# Patient Record
Sex: FEMALE | Employment: OTHER | URBAN - METROPOLITAN AREA
[De-identification: names, ages, dates, MRNs, and addresses within clinical notes are randomized per-mention and may not be internally consistent; named-entity substitution may affect disease eponyms.]

---

## 2017-07-14 ENCOUNTER — TELEPHONE (OUTPATIENT)
Dept: DERMATOLOGY | Facility: AMBULATORY SURGERY CENTER | Age: 82
End: 2017-07-14

## 2017-07-14 NOTE — TELEPHONE ENCOUNTER
Patient Appointment Date: 07/17/2017      Rodo Montemayor, 80 y.o., female  Is calling for their Mohs Pre-Op Assessment    does not have Hepatitis C or HIV (If YES, set up consult appointment)  does confirm site     Allergies: Allergies   Allergen Reactions    Morphine Nausea Only    Sulfa (Sulfonamide Antibiotics) Nausea Only         does not have a Pacemaker  does not have a Defibrillator    does not need antibiotics      is not taking NSAIDs    is taking aspirin      is not taking garlic  is not taking ginkgo  is not taking Ginseng  is not taking fish oils  is not taking vit E    does not take a blood thinner    is not taking Coumadin/Warfarin       Pre operative assessment questions asked to patient. Patient has a general understanding of the procedure, and has been versed that there will be local anesthesia used in the procedure and that She will be ok to drive themselves to and from the appointment.

## 2017-07-17 ENCOUNTER — OFFICE VISIT (OUTPATIENT)
Dept: DERMATOLOGY | Facility: AMBULATORY SURGERY CENTER | Age: 82
End: 2017-07-17

## 2017-07-17 VITALS
DIASTOLIC BLOOD PRESSURE: 72 MMHG | HEART RATE: 68 BPM | OXYGEN SATURATION: 97 % | BODY MASS INDEX: 29.06 KG/M2 | WEIGHT: 148 LBS | HEIGHT: 60 IN | SYSTOLIC BLOOD PRESSURE: 144 MMHG | TEMPERATURE: 97.6 F

## 2017-07-17 DIAGNOSIS — C44.329 SQUAMOUS CELL CARCINOMA OF SKIN OF LEFT CHEEK: Primary | ICD-10-CM

## 2017-07-17 RX ORDER — BUPIVACAINE HYDROCHLORIDE AND EPINEPHRINE 2.5; 5 MG/ML; UG/ML
INJECTION, SOLUTION INFILTRATION; PERINEURAL
Qty: 1.5 ML | Refills: 0
Start: 2017-07-17

## 2017-07-17 RX ORDER — LIDOCAINE HYDROCHLORIDE AND EPINEPHRINE 10; 10 MG/ML; UG/ML
3 INJECTION, SOLUTION INFILTRATION; PERINEURAL ONCE
Qty: 3 ML | Refills: 0
Start: 2017-07-17 | End: 2017-07-17

## 2017-07-17 NOTE — MR AVS SNAPSHOT
Visit Information Date & Time Provider Department Dept. Phone Encounter #  
 7/17/2017  1:00 PM MD Bandar Moody 8057 [phone] [id number] Upcoming Health Maintenance Date Due DTaP/Tdap/Td series (1 - Tdap) 6/25/1950 ZOSTER VACCINE AGE 60> 6/25/1989 GLAUCOMA SCREENING Q2Y 6/25/1994 Pneumococcal 65+ Low/Medium Risk (1 of 2 - PCV13) 6/25/1994 MEDICARE YEARLY EXAM 6/25/1994 INFLUENZA AGE 9 TO ADULT 8/1/2017 Allergies as of 7/17/2017  Review Complete On: 7/17/2017 By: Julianna [facility] Severity Noted Reaction Type Reactions Morphine  11/03/2014    Nausea Only Sulfa (Sulfonamide Antibiotics)  11/03/2014    Nausea Only Current Immunizations  Never Reviewed Name Date Influenza Vaccine 11/5/2014  1:31 PM  
  
 Not reviewed this visit You Were Diagnosed With   
  
 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  
 144/72 (BP 1 Location: Left arm, BP Patient Position: Sitting) 68 97.6 °F (36.4 °C) (Oral) 5' (1.524 m) 148 lb (67.1 kg) 97% BMI OB Status Smoking Status 28.9 kg/m2 Postmenopausal Former Smoker BMI and BSA Data Body Mass Index Body Surface Area  
 28.9 kg/m 2 1.69 m 2 Your Updated Medication List  
  
   
This list is accurate as of: 7/17/17  1:37 PM.  Always use your most recent med list.  
  
  
  
  
 aspirin 81 mg chewable tablet Take 81 mg by mouth daily. BYSTOLIC 5 mg tablet Generic drug:  nebivolol Take 5 mg by mouth daily. DIOVAN 320 mg tablet Generic drug:  valsartan Take 320 mg by mouth daily. glipiZIDE SR 2.5 mg CR tablet Commonly known as:  GLUCOTROL XL Take 2.5 mg by mouth daily. HYDROcodone-acetaminophen 5-325 mg per tablet Commonly known as:  Lexus Marroquin  
 Take 1 tablet by mouth every six (6) hours as needed for Pain.  
  
 metFORMIN 500 mg tablet Commonly known as:  GLUCOPHAGE Take 500 mg by mouth two (2) times daily (with meals). nitrofurantoin 50 mg capsule Commonly known as:  MACRODANTIN Take 50 mg by mouth daily. uti prophylaxis  
  
 pravastatin 80 mg tablet Commonly known as:  PRAVACHOL Take 80 mg by mouth nightly. PRESERVISION LUTEIN PO Take 1 tablet by mouth daily. TRIAMTERENE-HYDROCHLOROTHIAZIDE 37.5 MG-25 MG TAB Take 1 tablet by mouth daily. Patient Instructions WOUND CARE INSTRUCTIONS 1. Keep the dressing clean and dry and do not remove for 48 hours. 2. Then change the dressing once a day as follows: 
a. Wash hands before and after each dressing change. b. Remove dressing and wash site gently with mild soap and water, rinse, and pat dry. 
c. Apply an ointment (Bacitracin, Polysporin, Neosporin, Petroleum jelly or Aquaphor). d. Apply a non-stick (Telfa) dressing or Band-Aid to cover the wound. Remove pressure bandage Wednesday, then wash gently and apply Vaseline and a band-aid to site daily for 1 week. 3. Watch for: BLEEDING: A small amount of drainage may occur. If bleeding occurs, elevate and rest the surgery site. Apply gauze and steady pressure for 15 minutes. If bleeding continues, call this office. INFECTION: Signs of infection include increased redness, pain, warmth, drainage of pus, and fever. If this occurs, call this office. 4. Special Instructions (follow any that are checked): ·  You have stitches that need to be removed in 0 days ·  Avoid bending at the waist and heavy lifting for two days. ·  Sleep with your head elevated for the next two nights. ·  Rest the surgery site and keep it elevated as much as possible for two days. ·  You may apply an ice-pack for 10-15 minutes every waking hour for the rest of the day. ·  Eat a soft diet and avoid hot food and hot drinks for the rest of the day. ·  Other instructions: Follow up as directed Take Tylenol for pain as needed. Once the site is healed with no remaining bandages or open areas, protect your surgical site and scar from the sun, as this area will be more sensitive. Use a broad spectrum sunscreen SPF 30 or higher daily, and a chemical free product (one containing zinc oxide or titanium dioxide) is a good choice if the area is sensitive. You may begin to gently massage the surgical site in 2-3 weeks, rubbing in a circular motion along the scar. This can help reduce swelling and thickness of a scar. A scar cream may be used beginnning 1 month after the surgery. If you have any questions or concerns, please call our office Monday through Friday at [phone]. Introducing hospitals & HEALTH SERVICES! [facility] introduces [employer] patient portal. Now you can access parts of your medical record, email your doctor's office, and request medication refills online. 1. In your internet browser, go to [email or web address] 2. Click on the First Time User? Click Here link in the Sign In box. You will see the New Member Sign Up page. 3. Enter your [employer] Access Code exactly as it appears below. You will not need to use this code after youve completed the sign-up process. If you do not sign up before the expiration date, you must request a new code. · [employer] Access Code: [email or web address] Expires: 10/15/2017  1:37 PM 
 
4. Enter the last four digits of your Social Security Number (xxxx) and Date of Birth (mm/dd/yyyy) as indicated and click Submit. You will be taken to the next sign-up page. 5. Create a [employer] ID. This will be your [employer] login ID and cannot be changed, so think of one that is secure and easy to remember. 6. Create a [employer] password. You can change your password at any time. 7. Enter your Password Reset Question and Answer. This can be used at a later time if you forget your password. 8. Enter your e-mail address. You will receive e-mail notification when new information is available in [address] 9. Click Sign Up. You can now view and download portions of your medical record. 10. Click the Download Summary menu link to download a portable copy of your medical information. If you have questions, please visit the Frequently Asked Questions section of the [employer] website. Remember, [employer] is NOT to be used for urgent needs. For medical emergencies, dial 911. Now available from your iPhone and Android! Please provide this summary of care documentation to your next provider. Your primary care clinician is listed as Angélica Hull. If you have any questions after today's visit, please call [phone].

## 2017-07-17 NOTE — PATIENT INSTRUCTIONS
WOUND CARE INSTRUCTIONS    1. Keep the dressing clean and dry and do not remove for 48 hours. 2. Then change the dressing once a day as follows:  a. Wash hands before and after each dressing change. b. Remove dressing and wash site gently with mild soap and water, rinse, and pat dry.  c. Apply an ointment (Bacitracin, Polysporin, Neosporin, Petroleum jelly or Aquaphor). d. Apply a non-stick (Telfa) dressing or Band-Aid to cover the wound. Remove pressure bandage Wednesday, then wash gently and apply Vaseline and a band-aid to site daily for 1 week. 3. Watch for:  BLEEDING: A small amount of drainage may occur. If bleeding occurs, elevate and rest the surgery site. Apply gauze and steady pressure for 15 minutes. If bleeding continues, call this office. INFECTION: Signs of infection include increased redness, pain, warmth, drainage of pus, and fever. If this occurs, call this office. 4. Special Instructions (follow any that are checked):  · [] You have stitches that need to be removed in 0 days  · [x] Avoid bending at the waist and heavy lifting for two days. · [x] Sleep with your head elevated for the next two nights. · [x] Rest the surgery site and keep it elevated as much as possible for two days. · [x] You may apply an ice-pack for 10-15 minutes every waking hour for the rest of the day. · [] Eat a soft diet and avoid hot food and hot drinks for the rest of the day. · [] Other instructions: Follow up as directed  Take Tylenol for pain as needed. Once the site is healed with no remaining bandages or open areas, protect your surgical site and scar from the sun, as this area will be more sensitive. Use a broad spectrum sunscreen SPF 30 or higher daily, and a chemical free product (one containing zinc oxide or titanium dioxide) is a good choice if the area is sensitive. You may begin to gently massage the surgical site in 2-3 weeks, rubbing in a circular motion along the scar.  This can help reduce swelling and thickness of a scar. A scar cream may be used beginnning 1 month after the surgery. If you have any questions or concerns, please call our office Monday through Friday at [phone].

## 2017-07-17 NOTE — PROGRESS NOTES
Pre-op: Patient present today for the evaluation of SCC to the left mid cheek. Procedure explained with full understanding. Vitals:    07/17/17 1312   BP: 144/72   Pulse: 68   Temp: 97.6 °F (36.4 °C)   TempSrc: Oral   SpO2: 97%   Weight: 67.1 kg (148 lb)   Height: 5' (1.524 m)     preoperatively, will continue to monitor. Post-op: Written and verbal post-op wound care instructions given to patient with full understanding of care. Surgical wound bandaged with Vaseline, Telfa, 2x2 gauze, and coverall tape. All questions and concerns addressed. Vitals stable postoperatively.

## 2017-07-17 NOTE — PROGRESS NOTES
This note is written by Emiliano Kumari, as dictated by Camila Alford. Marie Rader MD.    CC: Squamous cell carcinoma on the left mid cheek     History of present illness:     Carmelita Grey is a 80 y.o. female referred by Dr. Eduardo Ley. 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 This is a new squamous cell carcinoma present for less than six months with no prior treatment. Biopsy confirmed the diagnosis of squamous cell carcinoma, and I reviewed the written pathology. She is feeling well and in her usual state of health today. She has no pain, no current illnesses, no other skin concerns. Her allergies, medications, medical, and social history are reviewed by me today. Her daughter is with her today. Exam:     She is an awake, alert, and oriented 80 y.o. female who appears well and in no distress. There is no preauricular, submandibular, or cervical lymphadenopathy. I examined her face. She has a  6 x 3 mm white scar adjacent to a lentigo, which has no concerning features, on her left mid cheek. She confirms location. Assessment/plan:    1. Squamous cell carcinoma, left mid cheek. I discussed the diagnosis of squamous cell carcinoma and summarized the pathology report. Mohs surgery is indicated by site and histology. The procedure was discussed, verbal and written consent were obtained. I performed the procedure. One stage was required to reach a tumor free plane. The surgical defect was managed with intermediate repair. There were no complications. She will follow up as needed as the site heals. Indications, risks, and options were discussed with Carmelita Grey preoperatively. Risks including, but not limited to: pain, bleeding, infection, tumor recurrence, scarring and damage to motor and/or sensory nerves, were discussed. Carmelita Grey chose Mohs surgery. Carmelita Grey was an acceptable surgery candidate.     Carmelita Grey was placed in the appropriate position on the operating table in the Mohs surgery procedure room. The area was prepped and draped in the standard manner. Gentian violet was used to outline the clinical margins of the tumor. Local anesthesia was then obtained. The grossly visible tumor was then removed, an underlying layer was excised and mapped according to the Mohs technique, and the individual specimens examined microscopically. The process was repeated until microscopic examination of the tissue specimens confirmed a tumor-free plane. Hemostasis was obtained with electrosurgery and pressure. The wound was covered between stages with moist saline gauze. The wound management options of second intent healing, layered closure, local flap, and/or full thickness skin graft were discussed. Rosario Byers understands the aims, risks, alternatives, and possible complications and elects to proceed with an intermediate layered closure. Wound margins were made vertical, edges undermined in the subcutaneous plane, standing cones corrected at both poles followed by layered closure. The wound was closed with buried 6-0 polysorb suture in the subcutis to reduce tension on the skin edges, and skin edges were approximated with 6-0 polysorb suture in the dermis to reduce tension on the epidermis. The final closure length was 18 mm. The wound was bandaged with Vaseline, Telfa, gauze and Coverroll. Wound care instructions (written and verbal) and a follow up appointment were given to Rosario Byers before discharge. Rosario Byers was discharged in good condition. 2. History of squamous cell carcinoma. I discussed the diagnosis and recommend routine examinations with Dr. Brown Puentes for surveillance. The documentation recorded by the scribe accurately reflects the service I personally performed and the decisions made by me.      [facility]   OFFICE PROCEDURE PROGRESS NOTE     Chart reviewed for the following:     I, Britt Duarte. Soledad Razo MD, have reviewed the History, Physical and updated the Allergic reactions for Crissy Delgado    TIME OUT performed immediately prior to start of procedure:     Lorine Aase. Soledad Razo MD, have performed the following reviews on Jaky Major prior to the start of the procedure:     * Patient was identified by name and date of birth   * Agreement on procedure being performed was verified   * Risks and Benefits explained to the patient   * Procedure site verified and marked as necessary   * Patient was positioned for comfort   * Consent was signed and verified     Time: 1:25 PM   Date of procedure: 7/17/2017  Procedure performed by: Britt Duarte.  Soledad Razo MD   Provider assisted by: LPN   Patient assisted by: self   How tolerated by patient: tolerated the procedure well with no complications   Comments: none